# Patient Record
Sex: MALE
[De-identification: names, ages, dates, MRNs, and addresses within clinical notes are randomized per-mention and may not be internally consistent; named-entity substitution may affect disease eponyms.]

---

## 2018-10-09 ENCOUNTER — HOSPITAL ENCOUNTER (EMERGENCY)
Dept: HOSPITAL 14 - H.ER | Age: 43
LOS: 1 days | Discharge: HOME | End: 2018-10-10
Payer: COMMERCIAL

## 2018-10-09 VITALS
SYSTOLIC BLOOD PRESSURE: 164 MMHG | DIASTOLIC BLOOD PRESSURE: 86 MMHG | HEART RATE: 65 BPM | TEMPERATURE: 98.4 F | RESPIRATION RATE: 16 BRPM | OXYGEN SATURATION: 98 %

## 2018-10-09 DIAGNOSIS — R21: Primary | ICD-10-CM

## 2018-10-09 DIAGNOSIS — Z98.84: ICD-10-CM

## 2018-10-09 PROCEDURE — 99284 EMERGENCY DEPT VISIT MOD MDM: CPT

## 2018-10-09 PROCEDURE — 96374 THER/PROPH/DIAG INJ IV PUSH: CPT

## 2018-10-09 PROCEDURE — 96375 TX/PRO/DX INJ NEW DRUG ADDON: CPT

## 2018-10-10 NOTE — ED PDOC
HPI: Skin/Bite Injury


Time Seen by Provider: 10/10/18 01:44


Chief Complaint (Nursing): Allergic Reaction


Chief Complaint (Provider): rash


History Per: Patient


History/Exam Limitations: no limitations


Onset/Duration Of Symptoms: Days (6)


Current Symptoms Are (Timing): Still Present


Quality Of Symptoms: Itching


Additional Complaint(s): 





43 y/o male presents for evaluation of pruritic rash x 6 days. Patient states 

rash started on left forearm, has since spread to neck, abdomen and head.  

Denies fever, facial swelling, difficulty speaking/swallowing, chest pain, 

shortness of breath, palpitations, known allergen








Past Medical History


Reviewed: Historical Data, Nursing Documentation, Vital Signs


Vital Signs: 





                                Last Vital Signs











Temp  98.4 F   10/09/18 23:25


 


Pulse  65   10/09/18 23:25


 


Resp  16   10/09/18 23:25


 


BP  164/86 H  10/09/18 23:25


 


Pulse Ox  98   10/09/18 23:25














- Medical History


PMH: No Chronic Diseases





- Surgical History


Other surgeries: gastric bypass





- Family History


Family History: States: No Known Family Hx





- Living Arrangements


Living Arrangements: With Family





- Home Medications


Home Medications: 


                                Ambulatory Orders











 Medication  Instructions  Recorded


 


DiphenhydrAMINE [Benadryl] 50 mg PO Q6 PRN #30 cap 10/10/18


 


Famotidine [Pepcid] 20 mg PO BID #8 tab 10/10/18


 


predniSONE [Prednisone] 60 mg PO DAILY #12 tab 10/10/18














- Allergies


Allergies/Adverse Reactions: 


                                    Allergies











Allergy/AdvReac Type Severity Reaction Status Date / Time


 


No Known Allergies Allergy   Verified 10/09/18 23:30














Review of Systems


ROS Statement: Except As Marked, All Systems Reviewed And Found Negative


Skin: Positive for: Rash





Physical Exam





- Reviewed


Nursing Documentation Reviewed: Yes


Vital Signs Reviewed: Yes





- Physical Exam


Appears: Positive for: Well, Non-toxic, No Acute Distress


Head Exam: Positive for: ATRAUMATIC, NORMAL INSPECTION, NORMOCEPHALIC


Skin: Positive for: Rash (erythematous raised rash to chest, in umbilicus, top 

of head, volar left forearm, b/l LE; no vesicles, lesions, temp change noted)


Cardiovascular/Chest: Positive for: Regular Rate, Rhythm


Respiratory: Positive for: Normal Breath Sounds


Gastrointestinal/Abdominal: Positive for: Normal Exam


Back: Positive for: Normal Inspection


Extremity: Positive for: Normal ROM


Neurologic/Psych: Positive for: Alert, Oriented (x3)





- ECG


O2 Sat by Pulse Oximetry: 98





- Progress


ED Course And Treament: 





IV solumedrol, IV benadryl, IV pepcid





On re-eval, redness improving


Patient educated on findings, discharged with rx Prednisone, Pepcid, Benadryl


Advised follow up PMD within 2-3 days


Return precautions given











Disposition





- Clinical Impression


Clinical Impression: 


 Rash and nonspecific skin eruption








- Patient ED Disposition


Is Patient to be Admitted: No


Counseled Patient/Family Regarding: Studies Performed, Diagnosis, Need For 

Followup, Rx Given





- Disposition


Referrals: 


[facility] [Outside]


Disposition: Routine/Home


Disposition Time: 04:04


Condition: IMPROVED


Prescriptions: 


DiphenhydrAMINE [Benadryl] 50 mg PO Q6 PRN #30 cap


 PRN Reason: Allergy Symptoms


Famotidine [Pepcid] 20 mg PO BID #8 tab


predniSONE [Prednisone] 60 mg PO DAILY #12 tab


Instructions:  Skin Rash


Print Language: [place]